# Patient Record
(demographics unavailable — no encounter records)

---

## 2024-10-24 NOTE — HISTORY OF PRESENT ILLNESS
[FreeTextEntry1] : Ms. MÉNDEZ is a 31-year-old female who returns with regard to a history of type 1 diabetes mellitus. There is no known history of nephropathy.  Regarding retinopathy, the patient has mild bleeding in the left and right eyes which she is being monitored for. She does have ongoing blurry vision in her right eye for the last year. Blurry vision hinders her reading. Has had two eye surgeries in the past. Follows closely with retinal specialist. Was recently told of cataracts and glaucoma b/l. Pending f/u.  With regard to neuropathy, she was diagnosed with diabetic neuropathy as per neurologist. Was on gabapentin. She reports tingling in her lower extremities and reports nerve damage as per podiatrist. She is now walking with a cane due to unsteady gait from significant numbness in her b/l LE from the knee down.  Patient has history of hidradenitis suppurativa with open wounds in the axilla and left groin. Ophthalmologic evaluation is up to date- doing washes because of bleeding, follows with Dr. Davidson. She denies polyuria, polydipsia, or any visual changes.  Patient admitted to [facility] from 24 to 24 due to DKA. Went in on 24 when outpatient labs revealed hyperglycemia. O 8/15/24 patient had anaphylactic reaction after eating an apple and Italian dressing at lunch- was given 0.3 mg epi M, 125 mg methylprednisolone IV, 25 mg diphenhydramine IV. Subsequently, BGs remained elevated. Patient states she received Lantus 75 units BID (scm note shows titrated up from 45 units to 74 units on day of d/c) with 35 units Admelog with each meal. Has been back on insulin pump since discharge from [facility].  Patient is currently on [employer] insulin pump utilizing Humalog insulin. Current pump settings are as follows: Basal: - 12AM: 5.2 - 6AM: 5.0 - 10AM: 5.0 - 5PM: 6.0  ICR: 1:1 ISF: 1:15 TB AIT: 5  PMD Dr. Faustin put her on Lantus 30 units AM and HS, along with Novolog about 20-25 units pre-meals, which she uses as back-up in case her pump is unavailable. however at LAST VISIT new script ordered to start on Tresiba 60 units in the AM   Home glucose monitoring has shown values of late to be averaging She does deny any significant hypoglycemic signs and symptoms of late.  Recent A1C returned at             previously 11.5% on labs 24. Was previously 13.5% in 2024. POCT glucose today at        mg/dL. ____________________________________________________________________________________________________ The patient also has a history of hypothyroidism. She was started on LT4 25 mcg daily 2022. She continues on this dose at this time.  TFTs 24 wnl with TSH 2.27.  She has been compliant in taking the LT4 daily, away from food or any medication that may inhibit absorption. She has tolerated this medication well without any apparent adverse effects. She denies any temperature intolerance, significant weight changes, or severe fatigue. She in addition denies any palpitations, tremors, anxiousness, change in bowel habits or significant change in moods. ____________________________________________________________________________________________________ Does take vit d now once per week.  SHx: she is currently living at a shelter.